# Patient Record
Sex: MALE | Race: BLACK OR AFRICAN AMERICAN | URBAN - METROPOLITAN AREA
[De-identification: names, ages, dates, MRNs, and addresses within clinical notes are randomized per-mention and may not be internally consistent; named-entity substitution may affect disease eponyms.]

---

## 2024-09-09 ENCOUNTER — APPOINTMENT (RX ONLY)
Dept: URBAN - METROPOLITAN AREA CLINIC 129 | Facility: CLINIC | Age: 29
Setting detail: DERMATOLOGY
End: 2024-09-09

## 2024-09-09 DIAGNOSIS — L29.89 OTHER PRURITUS: ICD-10-CM

## 2024-09-09 DIAGNOSIS — L63.8 OTHER ALOPECIA AREATA: ICD-10-CM

## 2024-09-09 PROBLEM — L29.8 OTHER PRURITUS: Status: ACTIVE | Noted: 2024-09-09

## 2024-09-09 PROCEDURE — ? COUNSELING

## 2024-09-09 PROCEDURE — ? INTRALESIONAL KENALOG

## 2024-09-09 PROCEDURE — 11900 INJECT SKIN LESIONS </W 7: CPT

## 2024-09-09 PROCEDURE — 99203 OFFICE O/P NEW LOW 30 MIN: CPT | Mod: 25

## 2024-09-09 ASSESSMENT — LOCATION DETAILED DESCRIPTION DERM: LOCATION DETAILED: LEFT SUPERIOR OCCIPITAL SCALP

## 2024-09-09 ASSESSMENT — LOCATION ZONE DERM: LOCATION ZONE: SCALP

## 2024-09-09 ASSESSMENT — LOCATION SIMPLE DESCRIPTION DERM: LOCATION SIMPLE: LEFT OCCIPITAL SCALP

## 2024-09-09 NOTE — PROCEDURE: INTRALESIONAL KENALOG
Concentration Of Kenalog Solution Injected (Mg/Ml): 5.0
Show Inventory Tab: Hide
Kenalog Type Of Vial: Multiple Dose
How Many Mls Were Removed From The 40 Mg/Ml (10ml) Vial When Preparing The Injectable Solution?: 0
Consent: The risks of atrophy were reviewed with the patient.
Detail Level: Detailed
Validate Note Data When Using Inventory: Yes
Ndc# For Kenalog Only: 0422-9989-51
Kenalog Preparation: Kenalog
Require Ndc Code?: No
Total Volume (Ccs): 0.5
Medical Necessity Clause: This procedure was medically necessary because the lesions that were treated were:

## 2024-10-09 ENCOUNTER — APPOINTMENT (RX ONLY)
Dept: URBAN - METROPOLITAN AREA CLINIC 129 | Facility: CLINIC | Age: 29
Setting detail: DERMATOLOGY
End: 2024-10-09

## 2024-10-09 DIAGNOSIS — L63.8 OTHER ALOPECIA AREATA: ICD-10-CM

## 2024-10-09 DIAGNOSIS — L29.89 OTHER PRURITUS: ICD-10-CM

## 2024-10-09 PROCEDURE — ? INTRALESIONAL KENALOG

## 2024-10-09 PROCEDURE — 11900 INJECT SKIN LESIONS </W 7: CPT

## 2024-10-09 PROCEDURE — ? COUNSELING

## 2024-10-09 PROCEDURE — 99213 OFFICE O/P EST LOW 20 MIN: CPT | Mod: 25

## 2024-10-09 ASSESSMENT — LOCATION DETAILED DESCRIPTION DERM
LOCATION DETAILED: LEFT SUPERIOR OCCIPITAL SCALP
LOCATION DETAILED: POSTERIOR MID-PARIETAL SCALP

## 2024-10-09 ASSESSMENT — LOCATION ZONE DERM: LOCATION ZONE: SCALP

## 2024-10-09 ASSESSMENT — LOCATION SIMPLE DESCRIPTION DERM
LOCATION SIMPLE: LEFT OCCIPITAL SCALP
LOCATION SIMPLE: POSTERIOR SCALP

## 2024-10-09 NOTE — PROCEDURE: INTRALESIONAL KENALOG
Concentration Of Kenalog Solution Injected (Mg/Ml): 5.0
Show Inventory Tab: Hide
Kenalog Type Of Vial: Multiple Dose
Lot # For Kenalog (Optional): [id number]
How Many Mls Were Removed From The 40 Mg/Ml (10ml) Vial When Preparing The Injectable Solution?: 0
Consent: The risks of atrophy were reviewed with the patient.
Detail Level: Detailed
Validate Note Data When Using Inventory: Yes
Expiration Date For Kenalog (Optional): 11/2025
Ndc# For Kenalog Only: [address]-3
Kenalog Preparation: Kenalog
Require Ndc Code?: No
Total Volume (Ccs): 0.5
Medical Necessity Clause: This procedure was medically necessary because the lesions that were treated were:

## 2024-11-12 ENCOUNTER — APPOINTMENT (RX ONLY)
Dept: URBAN - METROPOLITAN AREA CLINIC 129 | Facility: CLINIC | Age: 29
Setting detail: DERMATOLOGY
End: 2024-11-12

## 2024-11-12 DIAGNOSIS — L63.8 OTHER ALOPECIA AREATA: ICD-10-CM

## 2024-11-12 DIAGNOSIS — L29.89 OTHER PRURITUS: ICD-10-CM

## 2024-11-12 DIAGNOSIS — L57.8 OTHER SKIN CHANGES DUE TO CHRONIC EXPOSURE TO NONIONIZING RADIATION: ICD-10-CM

## 2024-11-12 PROCEDURE — ? COUNSELING

## 2024-11-12 PROCEDURE — ? PRESCRIPTION

## 2024-11-12 PROCEDURE — 99213 OFFICE O/P EST LOW 20 MIN: CPT | Mod: 25

## 2024-11-12 PROCEDURE — ? INTRALESIONAL KENALOG

## 2024-11-12 PROCEDURE — 11900 INJECT SKIN LESIONS </W 7: CPT

## 2024-11-12 RX ORDER — TRETIONIN 0.25 MG/G
CREAM TOPICAL
Qty: 45 | Refills: 4 | Status: ERX | COMMUNITY
Start: 2024-11-12

## 2024-11-12 RX ADMIN — TRETIONIN: 0.25 CREAM TOPICAL at 00:00

## 2024-11-12 ASSESSMENT — LOCATION DETAILED DESCRIPTION DERM
LOCATION DETAILED: POSTERIOR MID-PARIETAL SCALP
LOCATION DETAILED: LEFT SUPERIOR OCCIPITAL SCALP
LOCATION DETAILED: LEFT INFERIOR CENTRAL MALAR CHEEK

## 2024-11-12 ASSESSMENT — LOCATION SIMPLE DESCRIPTION DERM
LOCATION SIMPLE: POSTERIOR SCALP
LOCATION SIMPLE: LEFT OCCIPITAL SCALP
LOCATION SIMPLE: LEFT CHEEK

## 2024-11-12 ASSESSMENT — LOCATION ZONE DERM
LOCATION ZONE: FACE
LOCATION ZONE: SCALP

## 2024-11-12 NOTE — PROCEDURE: INTRALESIONAL KENALOG
Concentration Of Kenalog Solution Injected (Mg/Ml): 5.0
Show Inventory Tab: Hide
Kenalog Type Of Vial: Multiple Dose
Lot # For Kenalog (Optional): [id number]
How Many Mls Were Removed From The 40 Mg/Ml (10ml) Vial When Preparing The Injectable Solution?: 0
Consent: The risks of atrophy were reviewed with the patient.
Detail Level: Detailed
Validate Note Data When Using Inventory: Yes
Expiration Date For Kenalog (Optional): 11/2025
Ndc# For Kenalog Only: [address]-3
Kenalog Preparation: Kenalog
Require Ndc Code?: No
Total Volume (Ccs): 1
Medical Necessity Clause: This procedure was medically necessary because the lesions that were treated were:

## 2024-12-16 ENCOUNTER — APPOINTMENT (OUTPATIENT)
Dept: URBAN - METROPOLITAN AREA CLINIC 129 | Facility: CLINIC | Age: 29
Setting detail: DERMATOLOGY
End: 2024-12-16

## 2024-12-16 DIAGNOSIS — L63.8 OTHER ALOPECIA AREATA: ICD-10-CM

## 2024-12-16 DIAGNOSIS — L29.89 OTHER PRURITUS: ICD-10-CM

## 2024-12-16 PROCEDURE — 99212 OFFICE O/P EST SF 10 MIN: CPT | Mod: 25

## 2024-12-16 PROCEDURE — ? COUNSELING

## 2024-12-16 PROCEDURE — ? INTRALESIONAL KENALOG

## 2024-12-16 PROCEDURE — 11900 INJECT SKIN LESIONS </W 7: CPT

## 2024-12-16 ASSESSMENT — LOCATION SIMPLE DESCRIPTION DERM
LOCATION SIMPLE: LEFT SCALP
LOCATION SIMPLE: POSTERIOR SCALP
LOCATION SIMPLE: RIGHT SCALP
LOCATION SIMPLE: LEFT OCCIPITAL SCALP

## 2024-12-16 ASSESSMENT — LOCATION DETAILED DESCRIPTION DERM
LOCATION DETAILED: LEFT CENTRAL FRONTAL SCALP
LOCATION DETAILED: RIGHT CENTRAL FRONTAL SCALP
LOCATION DETAILED: LEFT SUPERIOR OCCIPITAL SCALP
LOCATION DETAILED: MID-OCCIPITAL SCALP
LOCATION DETAILED: POSTERIOR MID-PARIETAL SCALP

## 2024-12-16 ASSESSMENT — LOCATION ZONE DERM: LOCATION ZONE: SCALP

## 2024-12-16 NOTE — PROCEDURE: INTRALESIONAL KENALOG
Concentration Of Kenalog Solution Injected (Mg/Ml): 2.5
Show Inventory Tab: Hide
Kenalog Type Of Vial: Multiple Dose
Lot # For Kenalog (Optional): [id number]
How Many Mls Were Removed From The 40 Mg/Ml (10ml) Vial When Preparing The Injectable Solution?: 0
Consent: The risks of atrophy were reviewed with the patient.
Detail Level: Detailed
Validate Note Data When Using Inventory: Yes
Expiration Date For Kenalog (Optional): 8/2025
Ndc# For Kenalog Only: [address]-3
Kenalog Preparation: Kenalog
Require Ndc Code?: No
Total Volume (Ccs): 2
Medical Necessity Clause: This procedure was medically necessary because the lesions that were treated were: